# Patient Record
Sex: MALE | URBAN - METROPOLITAN AREA
[De-identification: names, ages, dates, MRNs, and addresses within clinical notes are randomized per-mention and may not be internally consistent; named-entity substitution may affect disease eponyms.]

---

## 2018-09-28 ENCOUNTER — OFFICE (OUTPATIENT)
Dept: URBAN - METROPOLITAN AREA CLINIC 78 | Facility: CLINIC | Age: 43
End: 2018-09-28

## 2018-09-28 VITALS
HEART RATE: 62 BPM | HEIGHT: 71 IN | SYSTOLIC BLOOD PRESSURE: 101 MMHG | WEIGHT: 173 LBS | TEMPERATURE: 97.6 F | DIASTOLIC BLOOD PRESSURE: 65 MMHG

## 2018-09-28 DIAGNOSIS — K03.2 EROSION OF TEETH: ICD-10-CM

## 2018-09-28 DIAGNOSIS — K21.9 GASTRO-ESOPHAGEAL REFLUX DISEASE WITHOUT ESOPHAGITIS: ICD-10-CM

## 2018-09-28 PROCEDURE — 99243 OFF/OP CNSLTJ NEW/EST LOW 30: CPT

## 2018-09-28 NOTE — SERVICEHPINOTES
LEE ANN NEWMAN   is a   43   year old male who is being seen in consultation at the request of   URIEL ESPARZA   for GERD. He would like to be tested for "GERD" as his teeth have eroded and his dentist is concerned. He denies heartburn, acid reflux, nausea, vomiting, dysphagia, early satiety, and anorexia. No chronic cough. Occasional clearing of his throat but no globus sensation. He already adheres to a low GERD diet. He doesn't smoke. No family hx of GI issues. Occasional constipation, stools are mostly a type 4 on the BSS.